# Patient Record
Sex: FEMALE | Race: ASIAN | NOT HISPANIC OR LATINO
[De-identification: names, ages, dates, MRNs, and addresses within clinical notes are randomized per-mention and may not be internally consistent; named-entity substitution may affect disease eponyms.]

---

## 2018-10-11 PROBLEM — Z00.00 ENCOUNTER FOR PREVENTIVE HEALTH EXAMINATION: Status: ACTIVE | Noted: 2018-10-11

## 2018-10-12 ENCOUNTER — TRANSCRIPTION ENCOUNTER (OUTPATIENT)
Age: 43
End: 2018-10-12

## 2018-11-13 ENCOUNTER — APPOINTMENT (OUTPATIENT)
Dept: OBGYN | Facility: CLINIC | Age: 43
End: 2018-11-13

## 2019-02-24 ENCOUNTER — EMERGENCY (EMERGENCY)
Facility: HOSPITAL | Age: 44
LOS: 1 days | Discharge: HOME | End: 2019-02-24
Attending: EMERGENCY MEDICINE
Payer: COMMERCIAL

## 2019-02-24 VITALS
SYSTOLIC BLOOD PRESSURE: 100 MMHG | RESPIRATION RATE: 18 BRPM | HEART RATE: 86 BPM | OXYGEN SATURATION: 100 % | DIASTOLIC BLOOD PRESSURE: 63 MMHG

## 2019-02-24 VITALS
RESPIRATION RATE: 18 BRPM | DIASTOLIC BLOOD PRESSURE: 70 MMHG | TEMPERATURE: 97 F | SYSTOLIC BLOOD PRESSURE: 105 MMHG | OXYGEN SATURATION: 99 % | HEART RATE: 88 BPM

## 2019-02-24 PROCEDURE — 85027 COMPLETE CBC AUTOMATED: CPT

## 2019-02-24 PROCEDURE — 80053 COMPREHEN METABOLIC PANEL: CPT

## 2019-02-24 PROCEDURE — 71045 X-RAY EXAM CHEST 1 VIEW: CPT | Mod: 26

## 2019-02-24 PROCEDURE — 93005 ELECTROCARDIOGRAM TRACING: CPT

## 2019-02-24 PROCEDURE — 93010 ELECTROCARDIOGRAM REPORT: CPT

## 2019-02-24 PROCEDURE — 85730 THROMBOPLASTIN TIME PARTIAL: CPT

## 2019-02-24 PROCEDURE — 84484 ASSAY OF TROPONIN QUANT: CPT

## 2019-02-24 PROCEDURE — 99284 EMERGENCY DEPT VISIT MOD MDM: CPT | Mod: 25

## 2019-02-24 PROCEDURE — 99283 EMERGENCY DEPT VISIT LOW MDM: CPT | Mod: 25

## 2019-02-24 PROCEDURE — 85379 FIBRIN DEGRADATION QUANT: CPT

## 2019-02-24 PROCEDURE — 71045 X-RAY EXAM CHEST 1 VIEW: CPT

## 2019-02-24 PROCEDURE — 85610 PROTHROMBIN TIME: CPT

## 2019-02-24 PROCEDURE — 84702 CHORIONIC GONADOTROPIN TEST: CPT

## 2019-02-24 RX ORDER — IBUPROFEN 200 MG
400 TABLET ORAL ONCE
Qty: 0 | Refills: 0 | Status: COMPLETED | OUTPATIENT
Start: 2019-02-24 | End: 2019-02-24

## 2019-02-24 RX ORDER — SODIUM CHLORIDE 9 MG/ML
1000 INJECTION INTRAMUSCULAR; INTRAVENOUS; SUBCUTANEOUS ONCE
Qty: 0 | Refills: 0 | Status: COMPLETED | OUTPATIENT
Start: 2019-02-24 | End: 2019-02-24

## 2019-02-24 RX ADMIN — Medication 400 MILLIGRAM(S): at 14:21

## 2019-02-24 RX ADMIN — SODIUM CHLORIDE 2000 MILLILITER(S): 9 INJECTION INTRAMUSCULAR; INTRAVENOUS; SUBCUTANEOUS at 12:00

## 2019-02-24 NOTE — ED PROVIDER NOTE - OBJECTIVE STATEMENT
43F presenting with chest pain since last night. 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 Pt then woke up at 4 am with left chest pain, constant, now getting better. Pt googled and thought that it was either anxiety attack or heart attack. Pt's concerned and came to the hospital. Pain is worse with cough. Pt denies any family hx of heart disease, non smoker, on OCP for heavy bleeding. No nausea, vomiting, fever or chills. No recent traveling and no unilateral leg swelling.

## 2019-02-24 NOTE — ED ADULT NURSE NOTE - OBJECTIVE STATEMENT
· Chief Complaint: The patient is a 43y Female complaining of chest pain.  · HPI Objective Statement: 43F presenting with chest pain since last night. 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 Pt then woke up at 4 am with left chest pain, constant, now getting better. Pt googled and thought that it was either anxiety attack or heart attack. Pt's concerned and came to the hospital. Pain is worse with cough. Pt denies any family hx of heart disease, non smoker, on OCP for heavy bleeding. No nausea, vomiting, fever or chills. No recent traveling and no unilateral leg swelling. 43 yr c/o with l sided chest pain since last night. Worsening on inspiration.  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 Pt thought that she might be having an anxiety attack.  Pt's concerned and came to the hospital. Pain is worse with cough. On birth control Denies recent travel.

## 2019-02-24 NOTE — ED PROVIDER NOTE - CLINICAL SUMMARY MEDICAL DECISION MAKING FREE TEXT BOX
43F presenting with chest pain since 4am, constant but now getting better. No diaphoresis endorsed. Will get cardiac workup done, on OCP, consider DDimer. If positive, CTA.

## 2019-02-24 NOTE — ED PROVIDER NOTE - ATTENDING CONTRIBUTION TO CARE
Patient presenting complaining of sharp L sided chest pains, non radiating, worse with deep inspiration.  Has had similar pains in the past but never this severe.  No history of CAD, no history of DVT/PE in past.  Not having shortness of breath, nausea, vomiting or diaphoresis.  Significant stress at home.  Non smoker.    A 14 point review of systems is negative except as in HPI or otherwise documented.    Exam:  General: Patient well appearing, vital signs within normal limits  HEENT: airway patent with moist mucous membranes  Cardiac: RRR S1/S2 with strong peripheral pulses  Respiratory: lungs clear without respiratory distress  GI: abdomen soft, non tender, non distended  Neuro: no gross neurologic deficits  Skin: warm, well perfused  Psych: normal mood and affect    Patient presenting with pleuritic L chest pains, no evidence clinically of aortic catastrophe, atypical presentation of ACS without risk factors, cannot PERC as on OCPs - plan for pain control, labs, CXR, trop/d-dimer and re-eval.

## 2019-02-24 NOTE — PROGRESS NOTE ADULT - ASSESSMENT
chest pain  all labs negative except for vey mild wbc    if xray is negative can dc home and follow up with pcp next week   likely pleuritic  nature

## 2019-02-24 NOTE — PROGRESS NOTE ADULT - SUBJECTIVE AND OBJECTIVE BOX
---___---___---___---___---___---___ ---___---___---___---___---___---___---___---___---___---                    <<<  M E D I C A L   A T T E N D I N G    F O L L O W    U P   N O T E  >>>    - Patient seen and examined by me approximately thirty minutes ago.  - In summary, patient is a 43y year old Female who origianlly presented with chest pain in the er now states the pain is on breathing and has been working hard. does not happen on excursion   - Patient today overall doing ok, comfortable, eating OK.     ---___---___---___---___---___---      <<<  MEDICATIONS:  >>>    MEDICATIONS  (STANDING):      MEDICATIONS  (PRN):       ---___---___---___---___---___---     <<<REVIEW OF SYSTEM: >>>    GEN: no fever, no chills, no pain  RESP: no SOB, no cough, no sputum  CVS:  chest pain, no palpitations, no edema  GI: no abdominal pain, no nausea, no vomiting, no constipation, no diarrhea  : no dysurea, no frequency  NEURO: no headache, no dizziness  PSYCH: no depression, not anxious  Derm : no itching, no rash     ---___---___---___---___---___---          <<<  VITAL SIGNS: >>>    T(F): 97.4 (02-24-19 @ 10:37), Max: 97.4 (02-24-19 @ 10:37)  HR: 84 (02-24-19 @ 12:39) (84 - 88)  BP: 124/70 (02-24-19 @ 12:39) (105/70 - 124/70)  RR: 18 (02-24-19 @ 12:39) (18 - 18)  SpO2: 100% (02-24-19 @ 12:39) (99% - 100%)  Wt(kg): --  CAPILLARY BLOOD GLUCOSE        I&O's Summary       ---___---___---___---___---___---                       PHYSICAL EXAM:    GEN: A&O X 3 , NAD , comfortable  HEENT: NCAT, PERRL, MMM, no scleral icterus, hearing intact  NECK: Supple, No JVD  CVS: S1S2 , regular , No M/R/G appreciated  PULM: CTA B/L,  no W/R/R appreciated  ABD.: soft. non tender, non distended,  bowel sounds present  Extrem: intact pulses , no edema noted  Derm: No rash or ecchymosis noted  PSYCH: normal mood, no depression, not anxious     ---___---___---___---___---___---     <<<  LAB AND IMAGING: >>>                          13.1   11.8  )-----------( 184      ( 24 Feb 2019 11:24 )             38.7               02-24    137  |  101  |  8   ----------------------------<  129<H>  3.9   |  24  |  0.65    Ca    9.2      24 Feb 2019 11:24    TPro  7.3  /  Alb  4.1  /  TBili  0.4  /  DBili  x   /  AST  18  /  ALT  15  /  AlkPhos  51  02-24    PT/INR - ( 24 Feb 2019 11:23 )   PT: 12.6 sec;   INR: 1.09 ratio         PTT - ( 24 Feb 2019 11:23 )  PTT:30.6 sec                           [All pertinent / recent available Imaging reports and other labs reviewed]     ---___---___---___---___---___---___ ---___---___---___---___---           <<<  A S S E S S M E N T   A N D   P L A N :  >>>          -GI/DVT Prophylaxis.    --------------------------------------------  Case discussed with   Education given on     >>______________________<<      Driss Magaña .         phone   [phone]

## 2019-02-24 NOTE — ED ADULT NURSE NOTE - NSIMPLEMENTINTERV_GEN_ALL_ED
Implemented All Universal Safety Interventions:  [place] to call system. Call bell, personal items and telephone within reach. Instruct patient to call for assistance. Room bathroom lighting operational. Non-slip footwear when patient is off stretcher. Physically safe environment: no spills, clutter or unnecessary equipment. Stretcher in lowest position, wheels locked, appropriate side rails in place.

## 2019-02-24 NOTE — ED PROVIDER NOTE - NSFOLLOWUPCLINICS_GEN_ALL_ED_FT
[facility]  Cardiology  [address] [place] [id number]  Phone: [phone]  Fax:   Follow Up Time:

## 2019-02-24 NOTE — ED PROVIDER NOTE - NSFOLLOWUPINSTRUCTIONS_ED_ALL_ED_FT
- See your primary care doctor in 2 days.  - See a cardiologist in 2 days for follow up.  - Take Motrin 600mg every 6 hour AND/OR Tylenol 650mg every 4 hour for pain. Take meds with food.  - Return to ED with any worsening of the symptoms.

## 2019-09-03 ENCOUNTER — APPOINTMENT (OUTPATIENT)
Dept: OTOLARYNGOLOGY | Facility: CLINIC | Age: 44
End: 2019-09-03
Payer: COMMERCIAL

## 2019-09-03 VITALS
HEIGHT: 62 IN | HEART RATE: 60 BPM | WEIGHT: 102 LBS | BODY MASS INDEX: 18.77 KG/M2 | SYSTOLIC BLOOD PRESSURE: 94 MMHG | DIASTOLIC BLOOD PRESSURE: 65 MMHG

## 2019-09-03 PROCEDURE — 99204 OFFICE O/P NEW MOD 45 MIN: CPT | Mod: 25

## 2019-09-03 PROCEDURE — 31231 NASAL ENDOSCOPY DX: CPT

## 2019-09-03 RX ORDER — METHYLPREDNISOLONE 4 MG/1
4 TABLET ORAL
Qty: 21 | Refills: 0 | Status: ACTIVE | COMMUNITY
Start: 2019-09-03 | End: 1900-01-01

## 2019-09-03 NOTE — HISTORY OF PRESENT ILLNESS
[de-identified] : Patient had surgery in [place] in June for her sinuses and is now living here. SHe states that she had the surgery because she had a CT of her sinuses that showed sinus inflammation. She had been suffering with nasal congestion and obstruction. SHe had issues breathing before the surgery and this has continued even after the surgery. SHe keeps trying to blow her nose with no benefit. SHe used a nasal spray for a few months with no benefit. SHe is very bothered by the fact she always has a sore throat- she thinks that since she got her tonsils out she has been sick. She was tested for allergies with no benefit and was on steroids with no benefit either. SHe does not tolerate being cold, either

## 2019-09-03 NOTE — ASSESSMENT
[FreeTextEntry1] : Patient long history of sinus tissues had surgery in [place] didn't really help she's here continuing to complain of some congestion and postnasal drip I put on a Medrol Dosepak and ipratropium bromide will reevaluate her in a month if there is no improvement consideration for immunology workup should be considered.

## 2019-09-03 NOTE — PHYSICAL EXAM
[Nasal Endoscopy Performed] : nasal endoscopy was performed, see procedure section for findings [] : septum deviated to the left [Midline] : trachea located in midline position [Removed] : palatine tonsils previously removed [Normal] : no rashes

## 2019-09-03 NOTE — REASON FOR VISIT
[Initial Evaluation] : an initial evaluation for [FreeTextEntry2] : nasal congsetion and obstruction

## 2019-10-03 ENCOUNTER — APPOINTMENT (OUTPATIENT)
Dept: OTOLARYNGOLOGY | Facility: CLINIC | Age: 44
End: 2019-10-03
Payer: COMMERCIAL

## 2019-10-03 VITALS
DIASTOLIC BLOOD PRESSURE: 63 MMHG | SYSTOLIC BLOOD PRESSURE: 100 MMHG | HEART RATE: 62 BPM | HEIGHT: 62 IN | BODY MASS INDEX: 18.58 KG/M2 | WEIGHT: 101 LBS

## 2019-10-03 DIAGNOSIS — R09.81 NASAL CONGESTION: ICD-10-CM

## 2019-10-03 DIAGNOSIS — J34.2 DEVIATED NASAL SEPTUM: ICD-10-CM

## 2019-10-03 DIAGNOSIS — J34.3 HYPERTROPHY OF NASAL TURBINATES: ICD-10-CM

## 2019-10-03 PROCEDURE — 99214 OFFICE O/P EST MOD 30 MIN: CPT

## 2019-10-03 NOTE — HISTORY OF PRESENT ILLNESS
[de-identified] : PAtient still feels like the nose is blocked and she has to blow but nothing comes out. She has been trying to do massages to warm her body as she feels cold a lot.  SHe finished the steroid last visit and it helped a little bit. In general she is a little bit better. She has not had a sore throat since the last visit but she does feel like she has occasional white bumps that are sore and painful on occasion. SHe feels like she needs to be doing a mouthwash to get rid of the bumps that she used to do but stopped. When these bumps are bad she cannot sleep at night

## 2019-10-03 NOTE — PHYSICAL EXAM
[] : septum deviated to the left [Removed] : palatine tonsils previously removed [Midline] : trachea located in midline position [Normal] : no rashes [de-identified] : hypertrophic

## 2019-10-03 NOTE — ASSESSMENT
[FreeTextEntry1] : Patient follows up improved with steroids but then congested once again she understands she has a deviated nasal septum not sure what it and fixing when they did her sinus surgery she would certainly benefit from a septoplasty next not sure she can afford it at this time because of her good palpable I put her back on Flonase nasal spray to come back and see us in 3 months to discuss further.

## 2020-01-27 ENCOUNTER — APPOINTMENT (OUTPATIENT)
Dept: OTOLARYNGOLOGY | Facility: CLINIC | Age: 45
End: 2020-01-27

## 2020-02-25 RX ORDER — IPRATROPIUM BROMIDE 21 UG/1
0.03 SPRAY NASAL
Qty: 3 | Refills: 3 | Status: ACTIVE | COMMUNITY
Start: 2019-09-03 | End: 1900-01-01

## 2020-04-13 ENCOUNTER — RX RENEWAL (OUTPATIENT)
Age: 45
End: 2020-04-13

## 2020-04-13 RX ORDER — FLUTICASONE PROPIONATE 50 UG/1
50 SPRAY, METERED NASAL DAILY
Qty: 48 | Refills: 3 | Status: ACTIVE | COMMUNITY
Start: 2019-10-03 | End: 1900-01-01

## 2021-03-02 NOTE — DATA REVIEWED
[No studies available for review at this time] : No studies available for review at this time
Performed

## 2021-04-08 ENCOUNTER — RX RENEWAL (OUTPATIENT)
Age: 46
End: 2021-04-08

## 2021-07-26 ENCOUNTER — TRANSCRIPTION ENCOUNTER (OUTPATIENT)
Age: 46
End: 2021-07-26

## 2021-07-28 ENCOUNTER — TRANSCRIPTION ENCOUNTER (OUTPATIENT)
Age: 46
End: 2021-07-28

## 2022-06-28 ENCOUNTER — APPOINTMENT (OUTPATIENT)
Dept: AFTER HOURS CARE | Facility: EMERGENCY ROOM | Age: 47
End: 2022-06-28

## 2023-10-18 ENCOUNTER — APPOINTMENT (OUTPATIENT)
Dept: UROLOGY | Facility: CLINIC | Age: 48
End: 2023-10-18
Payer: COMMERCIAL

## 2023-10-18 VITALS
BODY MASS INDEX: 19.6 KG/M2 | DIASTOLIC BLOOD PRESSURE: 69 MMHG | OXYGEN SATURATION: 97 % | SYSTOLIC BLOOD PRESSURE: 105 MMHG | WEIGHT: 106.5 LBS | HEIGHT: 62 IN | HEART RATE: 71 BPM | TEMPERATURE: 97.8 F | RESPIRATION RATE: 16 BRPM

## 2023-10-18 DIAGNOSIS — R31.29 OTHER MICROSCOPIC HEMATURIA: ICD-10-CM

## 2023-10-18 PROCEDURE — 99203 OFFICE O/P NEW LOW 30 MIN: CPT

## 2023-10-21 PROBLEM — R31.29 MICROHEMATURIA: Status: ACTIVE | Noted: 2023-10-18

## 2023-10-21 LAB
APPEARANCE: ABNORMAL
BACTERIA UR CULT: NORMAL
BACTERIA: ABNORMAL /HPF
BILIRUBIN URINE: NEGATIVE
BLOOD URINE: NEGATIVE
CAST: NORMAL /LPF
COLOR: YELLOW
EPITHELIAL CELLS: 4 /HPF
GLUCOSE QUALITATIVE U: NEGATIVE MG/DL
KETONES URINE: NEGATIVE MG/DL
LEUKOCYTE ESTERASE URINE: NEGATIVE
MICROSCOPIC-UA: NORMAL
NITRITE URINE: NEGATIVE
PH URINE: 8.5
PROTEIN URINE: 30 MG/DL
RED BLOOD CELLS URINE: NORMAL /HPF
REVIEW: NORMAL
SPECIFIC GRAVITY URINE: 1.03
UROBILINOGEN URINE: 1 MG/DL
WHITE BLOOD CELLS URINE: 0 /HPF

## 2023-12-06 ENCOUNTER — OFFICE (OUTPATIENT)
Dept: URBAN - METROPOLITAN AREA CLINIC 27 | Facility: CLINIC | Age: 48
Setting detail: OPHTHALMOLOGY
End: 2023-12-06
Payer: MEDICAID

## 2023-12-06 DIAGNOSIS — H35.40: ICD-10-CM

## 2023-12-06 DIAGNOSIS — H40.033: ICD-10-CM

## 2023-12-06 DIAGNOSIS — L82.1: ICD-10-CM

## 2023-12-06 DIAGNOSIS — H52.4: ICD-10-CM

## 2023-12-06 DIAGNOSIS — H16.223: ICD-10-CM

## 2023-12-06 PROBLEM — H02.89 MEIBOMIAN GLAND DYSFUNCTION ; BOTH EYES: Status: ACTIVE | Noted: 2023-12-06

## 2023-12-06 PROCEDURE — 92015 DETERMINE REFRACTIVE STATE: CPT | Performed by: OPHTHALMOLOGY

## 2023-12-06 PROCEDURE — 92014 COMPRE OPH EXAM EST PT 1/>: CPT | Performed by: OPHTHALMOLOGY

## 2023-12-06 PROCEDURE — 92133 CPTRZD OPH DX IMG PST SGM ON: CPT | Performed by: OPHTHALMOLOGY

## 2023-12-06 PROCEDURE — 92083 EXTENDED VISUAL FIELD XM: CPT | Performed by: OPHTHALMOLOGY

## 2023-12-06 ASSESSMENT — CONFRONTATIONAL VISUAL FIELD TEST (CVF)
OD_FINDINGS: FULL
OS_FINDINGS: FULL

## 2023-12-06 ASSESSMENT — SUPERFICIAL PUNCTATE KERATITIS (SPK)
OS_SPK: T
OD_SPK: T

## 2023-12-06 ASSESSMENT — LID EXAM ASSESSMENTS
OD_MEIBOMITIS: 2+
OS_MEIBOMITIS: 2+

## 2023-12-07 ASSESSMENT — REFRACTION_CURRENTRX
OD_OVR_VA: 20/
OS_CYLINDER: +0.50
OD_AXIS: 153
OD_CYLINDER: +0.75
OS_VPRISM_DIRECTION: SV
OS_SPHERE: -3.25
OD_SPHERE: -2.50
OS_AXIS: 9
OD_VPRISM_DIRECTION: SV
OS_OVR_VA: 20/

## 2023-12-07 ASSESSMENT — SPHEQUIV_DERIVED
OS_SPHEQUIV: -3
OD_SPHEQUIV: -2.125
OD_SPHEQUIV: -1.625
OD_SPHEQUIV: -2.125
OS_SPHEQUIV: -3
OD_SPHEQUIV: -2.125
OS_SPHEQUIV: -2.75
OS_SPHEQUIV: -2.5

## 2023-12-07 ASSESSMENT — REFRACTION_MANIFEST
OD_AXIS: 075
OS_AXIS: 165
OD_VA1: 20/20
OS_AXIS: 180
OS_CYLINDER: -0.50
OS_ADD: +2.25
OD_AXIS: 160
OS_VA2: 20/20
OD_ADD: +2.25
OD_CYLINDER: +0.75
OS_VA1: 20/20
OS_VA1: 20/20-1
OS_ADD: +2.00
OD_SPHERE: -2.50
OS_CYLINDER: +0.50
OD_VA2: 20/20
OS_VA1: 20/20
OD_ADD: +2.25
OD_SPHERE: -2.50
OS_ADD: +2.25
OD_VA1: 20/20
OS_SPHERE: -3.25
OD_CYLINDER: +0.75
OS_SPHERE: -2.75
OS_SPHERE: -3.00
OD_SPHERE: -1.75
OS_CYLINDER: +0.50
OD_AXIS: 160
OS_AXIS: 095
OD_ADD: +2.00
OD_VA1: 20/20
OD_CYLINDER: -0.75

## 2023-12-07 ASSESSMENT — REFRACTION_AUTOREFRACTION
OS_SPHERE: -3.00
OS_CYLINDER: +1.00
OD_AXIS: 155
OD_SPHERE: -2.00
OD_CYLINDER: +0.75
OS_AXIS: 004